# Patient Record
Sex: MALE | Race: WHITE | NOT HISPANIC OR LATINO | Employment: OTHER | URBAN - METROPOLITAN AREA
[De-identification: names, ages, dates, MRNs, and addresses within clinical notes are randomized per-mention and may not be internally consistent; named-entity substitution may affect disease eponyms.]

---

## 2017-08-24 ENCOUNTER — HOSPITAL ENCOUNTER (EMERGENCY)
Facility: HOSPITAL | Age: 37
Discharge: HOME | End: 2017-08-24
Attending: SURGERY

## 2017-08-24 ENCOUNTER — NURSE TRIAGE (OUTPATIENT)
Dept: ADMINISTRATIVE | Facility: CLINIC | Age: 37
End: 2017-08-24

## 2017-08-24 VITALS
HEART RATE: 98 BPM | SYSTOLIC BLOOD PRESSURE: 144 MMHG | DIASTOLIC BLOOD PRESSURE: 96 MMHG | OXYGEN SATURATION: 98 % | RESPIRATION RATE: 20 BRPM | TEMPERATURE: 98 F | WEIGHT: 170 LBS

## 2017-08-24 DIAGNOSIS — R51.9 NONINTRACTABLE HEADACHE, UNSPECIFIED CHRONICITY PATTERN, UNSPECIFIED HEADACHE TYPE: Primary | ICD-10-CM

## 2017-08-24 PROCEDURE — 99284 EMERGENCY DEPT VISIT MOD MDM: CPT

## 2017-08-24 PROCEDURE — 25000003 PHARM REV CODE 250: Performed by: SURGERY

## 2017-08-24 RX ORDER — ONDANSETRON 4 MG/1
4 TABLET, ORALLY DISINTEGRATING ORAL EVERY 8 HOURS PRN
Qty: 20 TABLET | Refills: 0 | OUTPATIENT
Start: 2017-08-24 | End: 2021-01-20

## 2017-08-24 RX ORDER — KETOROLAC TROMETHAMINE 10 MG/1
10 TABLET, FILM COATED ORAL EVERY 6 HOURS PRN
Qty: 15 TABLET | Refills: 0 | OUTPATIENT
Start: 2017-08-24 | End: 2021-01-20

## 2017-08-24 RX ORDER — HYDROCODONE BITARTRATE AND ACETAMINOPHEN 5; 325 MG/1; MG/1
1 TABLET ORAL
Status: COMPLETED | OUTPATIENT
Start: 2017-08-24 | End: 2017-08-24

## 2017-08-24 RX ADMIN — HYDROCODONE BITARTRATE AND ACETAMINOPHEN 1 TABLET: 5; 325 TABLET ORAL at 06:08

## 2017-08-24 NOTE — ED NOTES
Received verbal report from GERALDINE Velasco.  Pt in ED room ED 01/ED 01B lying in stretcher, HOB 30 degrees. Stretcher is in low, locked position, side rails up x2. The patient is awake, alert and cooperative with a calm affect, patient is aware of environment. Airway is open and patent, respirations are spontaneous, normal respiratory effort and rate noted, skin warm and dry, full ROM in all extremities, appearance: NAD noted, resting comfortably.Call bell within reach of pt, pt instructed on use, pt verbalizes understanding of call bell use. Hourly rounding explained and white board updated.  Plan of care: family to bedside, observe and reassure, position of comfort, respirations even and unlabored, patient offers no complaints at this time, awaiting additional orders, will continue to monitor

## 2017-08-25 ENCOUNTER — HOSPITAL ENCOUNTER (EMERGENCY)
Facility: HOSPITAL | Age: 37
Discharge: HOME | End: 2017-08-25
Attending: SURGERY

## 2017-08-25 VITALS
WEIGHT: 170 LBS | HEART RATE: 86 BPM | RESPIRATION RATE: 16 BRPM | BODY MASS INDEX: 25.18 KG/M2 | TEMPERATURE: 97 F | SYSTOLIC BLOOD PRESSURE: 166 MMHG | DIASTOLIC BLOOD PRESSURE: 99 MMHG | HEIGHT: 69 IN

## 2017-08-25 DIAGNOSIS — R51.9 HEADACHE, UNSPECIFIED HEADACHE TYPE: Primary | ICD-10-CM

## 2017-08-25 DIAGNOSIS — R51.9 HA (HEADACHE): ICD-10-CM

## 2017-08-25 LAB
ALBUMIN SERPL BCP-MCNC: 4.1 G/DL
ALP SERPL-CCNC: 75 U/L
ALT SERPL W/O P-5'-P-CCNC: 88 U/L
ANION GAP SERPL CALC-SCNC: 10 MMOL/L
AST SERPL-CCNC: 43 U/L
BASOPHILS # BLD AUTO: 0.04 K/UL
BASOPHILS NFR BLD: 0.4 %
BILIRUB SERPL-MCNC: 0.4 MG/DL
BUN SERPL-MCNC: 14 MG/DL
CALCIUM SERPL-MCNC: 9.9 MG/DL
CHLORIDE SERPL-SCNC: 106 MMOL/L
CK MB SERPL-MCNC: 1 NG/ML
CK MB SERPL-RTO: 1 %
CK SERPL-CCNC: 96 U/L
CK SERPL-CCNC: 96 U/L
CO2 SERPL-SCNC: 28 MMOL/L
CREAT SERPL-MCNC: 1.1 MG/DL
CRP SERPL-MCNC: 1.2 MG/L
DIFFERENTIAL METHOD: NORMAL
EOSINOPHIL # BLD AUTO: 0.3 K/UL
EOSINOPHIL NFR BLD: 3.2 %
ERYTHROCYTE [DISTWIDTH] IN BLOOD BY AUTOMATED COUNT: 13.2 %
ERYTHROCYTE [SEDIMENTATION RATE] IN BLOOD BY WESTERGREN METHOD: 4 MM/HR
EST. GFR  (AFRICAN AMERICAN): >60 ML/MIN/1.73 M^2
EST. GFR  (NON AFRICAN AMERICAN): >60 ML/MIN/1.73 M^2
GLUCOSE SERPL-MCNC: 111 MG/DL
HCT VFR BLD AUTO: 45.8 %
HGB BLD-MCNC: 14.9 G/DL
LYMPHOCYTES # BLD AUTO: 4.3 K/UL
LYMPHOCYTES NFR BLD: 41.8 %
MCH RBC QN AUTO: 30.8 PG
MCHC RBC AUTO-ENTMCNC: 32.5 G/DL
MCV RBC AUTO: 95 FL
MONOCYTES # BLD AUTO: 1 K/UL
MONOCYTES NFR BLD: 9.6 %
NEUTROPHILS # BLD AUTO: 4.6 K/UL
NEUTROPHILS NFR BLD: 45 %
PLATELET # BLD AUTO: 198 K/UL
PMV BLD AUTO: 11.6 FL
POTASSIUM SERPL-SCNC: 4.1 MMOL/L
PROT SERPL-MCNC: 7.3 G/DL
RBC # BLD AUTO: 4.84 M/UL
SODIUM SERPL-SCNC: 144 MMOL/L
TROPONIN I SERPL DL<=0.01 NG/ML-MCNC: <0.006 NG/ML
WBC # BLD AUTO: 10.27 K/UL

## 2017-08-25 PROCEDURE — 36415 COLL VENOUS BLD VENIPUNCTURE: CPT

## 2017-08-25 PROCEDURE — 84484 ASSAY OF TROPONIN QUANT: CPT

## 2017-08-25 PROCEDURE — 93005 ELECTROCARDIOGRAM TRACING: CPT

## 2017-08-25 PROCEDURE — 85651 RBC SED RATE NONAUTOMATED: CPT

## 2017-08-25 PROCEDURE — 85025 COMPLETE CBC W/AUTO DIFF WBC: CPT

## 2017-08-25 PROCEDURE — 99284 EMERGENCY DEPT VISIT MOD MDM: CPT

## 2017-08-25 PROCEDURE — 63600175 PHARM REV CODE 636 W HCPCS: Performed by: SURGERY

## 2017-08-25 PROCEDURE — 82553 CREATINE MB FRACTION: CPT

## 2017-08-25 PROCEDURE — 93010 ELECTROCARDIOGRAM REPORT: CPT | Mod: ,,, | Performed by: INTERNAL MEDICINE

## 2017-08-25 PROCEDURE — 80053 COMPREHEN METABOLIC PANEL: CPT

## 2017-08-25 PROCEDURE — 96372 THER/PROPH/DIAG INJ SC/IM: CPT

## 2017-08-25 PROCEDURE — 86140 C-REACTIVE PROTEIN: CPT

## 2017-08-25 RX ORDER — HYDROCODONE BITARTRATE AND ACETAMINOPHEN 5; 325 MG/1; MG/1
1 TABLET ORAL EVERY 4 HOURS PRN
Qty: 10 TABLET | Refills: 0 | Status: SHIPPED | OUTPATIENT
Start: 2017-08-25 | End: 2017-09-04

## 2017-08-25 RX ORDER — PROMETHAZINE HYDROCHLORIDE 25 MG/ML
12.5 INJECTION, SOLUTION INTRAMUSCULAR; INTRAVENOUS
Status: COMPLETED | OUTPATIENT
Start: 2017-08-25 | End: 2017-08-25

## 2017-08-25 RX ORDER — HYDROMORPHONE HYDROCHLORIDE 1 MG/ML
1 INJECTION, SOLUTION INTRAMUSCULAR; INTRAVENOUS; SUBCUTANEOUS
Status: COMPLETED | OUTPATIENT
Start: 2017-08-25 | End: 2017-08-25

## 2017-08-25 RX ADMIN — HYDROMORPHONE HYDROCHLORIDE 1 MG: 1 INJECTION, SOLUTION INTRAMUSCULAR; INTRAVENOUS; SUBCUTANEOUS at 12:08

## 2017-08-25 RX ADMIN — PROMETHAZINE HYDROCHLORIDE 12.5 MG: 25 INJECTION INTRAMUSCULAR; INTRAVENOUS at 12:08

## 2017-08-25 NOTE — ED TRIAGE NOTES
"37 y.o. male presents to ER ED 01/ED 01A   Chief Complaint   Patient presents with    Headache     not resolved with meds given today   . Pt states that he took the medications and the right-sided "sharp shooting" pain is not getting any better.  "

## 2017-08-25 NOTE — TELEPHONE ENCOUNTER
Reason for Disposition   [1] SEVERE headache (e.g., excruciating) AND [2] not improved after 2 hours of pain medicine    Answer Assessment - Initial Assessment Questions  Girlfriend calling for pt. Reported he was seen in ER today for pain in back of head and behind ears. Had CT scan which was negative. Was dx with tension headache. Given medication when in ER and scrip for toradol which he took about 2030 - has not given any pain relief. Wants to know what else they can do.    Protocols used: ST HEADACHE-A-AH

## 2017-08-25 NOTE — ED PROVIDER NOTES
[facility]                                        August 24, 2017                   Chief Complaint  37 y.o. male with Headache (right temple area)    History of Present Illness  Dawson Jansen presents to the emergency room with right temple headache today  Patient is a sharp pain at the base of the right skull, radiating to the right temple  Patient on exam is alert and oriented ×3 with a GCS 15 and normal motor exam  Patient has no photophobia, no nausea vomiting, no hypertensive crisis tonight  Patient has good extraocular movement normal vision, normal accommodation  Has had headaches for just never this intense, denies thunderclap headache    The history is provided by the patient  History reviewed. No pertinent past medical history.  History reviewed. No pertinent surgical history.   No Known Allergies   History reviewed. No pertinent family history.    Review of Systems and Physical Exam     Review of Systems  -- Constitution - no fever, denies fatigue, no weakness, no chills  -- Eyes - no tearing or redness, no visual disturbance  -- Ear, Nose - no tinnitus or earache, no nasal congestion or discharge  -- Mouth,Throat - no sore throat, no toothache, normal voice, normal swallowing  -- Respiratory - denies cough and congestion, no shortness of breath, no LAND  -- Cardiovascular - denies chest pain, no palpitations, denies claudication  -- Gastrointestinal - denies abdominal pain, nausea, vomiting, or diarrhea  -- Musculoskeletal - denies back pain, negative for myalgias and arthralgias   -- Neurological - headache, denies weakness or seizure; no LOC  -- Skin - denies pallor, rash, or changes in skin. no hives or welts noted    Vital Signs  -- His oral temperature is 97.5 °F (36.4 °C).   -- His blood pressure is 144/96 and his pulse is 98.   -- His respiration is 20 and oxygen saturation is 98%.      Physical Exam  -- Nursing note and vitals reviewed  -- Head: Atraumatic. Normocephalic. No  obvious abnormality  -- Eyes: Pupils are equal and reactive to light. Normal conjunctiva and lids  -- Nose: Nose normal in appearance, nares grossly normal. No discharge  -- Throat: Mucous membranes moist, pharynx normal, normal tonsils. No lesions   -- Ears: External ears and TM normal bilaterally. Normal hearing and no drainage  -- Neck: Normal range of motion. Neck supple. No masses, trachea midline  -- Cardiac: Normal rate, regular rhythm and normal heart sounds  -- Pulmonary: Normal respiratory effort, breath sounds clear to auscultation  -- Abdominal: Soft, no tenderness. Normal bowel sounds. Normal liver edge  -- Musculoskeletal: Normal range of motion, no effusions. Joints stable   -- Neurological: No focal deficits. Showed good interaction with staff  -- Vascular: Posterior tibial, dorsalis pedis and radial pulses 2+ bilaterally  e    Emergency Room Course     Treatment and Evaluation  -- The CT of the head performed in the ER today was negative for acute pathology   -- PO 5 mg Norco given in today in the ER    Diagnosis  -- Nonintractable headache    Disposition and Plan  -- Disposition: home  -- Condition: stable  -- Follow-up: Patient to follow up with No primary care provider on file. in 1-2 days.  -- I advised the patient that we have found no life threatening condition today  -- At this time, I believe the patient is clinically stable for discharge.   -- The patient acknowledges that close follow up with a MD is required   -- Patient agrees to comply with all instruction and direction given in the ER    This note is dictated on Dragon Natural Speaking word recognition program.  There are word recognition mistakes that are occasionally missed on review.             Michael Hernandez MD  08/24/17 [id number]

## 2017-08-25 NOTE — ED PROVIDER NOTES
[facility]                                        August 25, 2017                   Chief Complaint  37 y.o. male with Headache (not resolved with meds given today)    History of Present Illness  Dawson Jansen presents to the emergency room with continued right-sided headache  Was seen earlier tonight the emergency room with a negative head CT for headache  Pt declined a shot at that time, states the by mouth and given relieved his headache  States the headache returned when he got home, same position for the past 2 days  Describes a sharp headache, from the right posterior scalp radiating to the temple  Patient has negative meningeal signs, has no signs of rash or shingles on exam  Pt states that the pain is reproducible when touching the right temple scalp area  Patient is again alert and oriented ×3 with a GCS 15 and normal neuro exam now  Works very hard with some stress, previous consideration of tension headache    The history is provided by the patient  History reviewed. No pertinent past medical history.  History reviewed. No pertinent surgical history.   No Known Allergies   No family history on file.    Review of Systems and Physical Exam     Review of Systems  -- Constitution - no fever, denies fatigue, no weakness, no chills  -- Eyes - no tearing or redness, no visual disturbance  -- Ear, Nose - no tinnitus or earache, no nasal congestion or discharge  -- Mouth,Throat - no sore throat, no toothache, normal voice, normal swallowing  -- Respiratory - denies cough and congestion, no shortness of breath, no LAND  -- Cardiovascular - denies chest pain, no palpitations, denies claudication  -- Gastrointestinal - denies abdominal pain, nausea, vomiting, or diarrhea  -- Genitourinary - no dysuria, no denies flank pain, no hematuria or frequency   -- Musculoskeletal - denies back pain, negative for myalgias and arthralgias   -- Neurological - headache, denies weakness or seizure; no LOC  -- Skin  - denies pallor, rash, or changes in skin. no hives or welts noted    Vital Signs  -- His oral temperature is 96.6 °F (35.9 °C).   -- His blood pressure is 166/99 and his pulse is 86.   -- His respiration is 16.      Physical Exam  -- Nursing note and vitals reviewed  -- Head: Atraumatic. Normocephalic. No obvious abnormality  -- Eyes: Pupils are equal and reactive to light. Normal conjunctiva and lids  -- Nose: Nose normal in appearance, nares grossly normal. No discharge  -- Throat: Mucous membranes moist, pharynx normal, normal tonsils. No lesions   -- Ears: External ears and TM normal bilaterally. Normal hearing and no drainage  -- Neck: Normal range of motion. Neck supple. No masses, trachea midline  -- Cardiac: Normal rate, regular rhythm and normal heart sounds  -- Pulmonary: Normal respiratory effort, breath sounds clear to auscultation  -- Abdominal: Soft, no tenderness. Normal bowel sounds. Normal liver edge  -- Musculoskeletal: Normal range of motion, no effusions. Joints stable   -- Neurological: No focal deficits. Showed good interaction with staff  -- Vascular: Posterior tibial, dorsalis pedis and radial pulses 2+ bilaterally      Emergency Room Course     Treatment and Evaluation  -- The CT of the head performed in the ER today was negative for acute pathology   -- The EKG findings today were without concerning findings from baseline   -- ESR and CRP are within normal limits    Lab values  --    -- K 4.1   --    -- CO2 28   -- BUN 14   -- CREATININE 1.1   --  (H)   -- ALKPHOS 75   -- AST 43 (H)   -- ALT 88 (H)   -- BILITOT 0.4   -- ALBUMIN 4.1   -- PROT 7.3   -- WBC 10.27   -- HGB 14.9   -- HCT 45.8   --    -- CPK 96   -- CPK 96   -- CPKMB 1.0   -- TROPONINI <0.006     Medications Given  -- IM 1 mg Dilaudid given today in the ER  -- IM 12.5 mg Phenergan given today in the ER     Medical Decision Making  -- Diagnosis management comments: 37 y.o. male with right-sided headache  ×2-3 days  -- Patient states the pain starts in the right skull base, radiating to the temple area  -- Patient was seen in the ER earlier tonight with a negative head CT at that time  -- Patient states his headache went away and then came back when he got home  -- Patient returns the ER tonight stating he has continued headache after discharge  -- Patient has head CT repeated which was again negative, negative metabolic workup  -- Normal EKG and lab work, no white count noted in the ER  -- Normal ESR and CRP also noted, we'll treat the headache conservatively  -- This headache present for couple days, counseled on neurology follow-up    Diagnosis  -- Headache    Disposition and Plan  -- Disposition: home  -- Condition: stable  -- Follow-up: Patient to follow up with M.D./neurology in 1-2 days.  -- I advised the patient that we have found no life threatening condition today  -- At this time, I believe the patient is clinically stable for discharge.   -- The patient acknowledges that close follow up with a MD is required   -- Patient agrees to comply with all instruction and direction given in the ER    This note is dictated on Dragon Natural Speaking word recognition program.  There are word recognition mistakes that are occasionally missed on review.           Michael Hernandez MD  08/25/17 0863

## 2017-08-25 NOTE — ED NOTES
Pt refusing to leave. States that the Toradol doesn't work and wants to be re-evaluated by the MD. Dr. Hernandez notified. Family is at the bedside.

## 2021-01-20 ENCOUNTER — HOSPITAL ENCOUNTER (EMERGENCY)
Facility: HOSPITAL | Age: 41
Discharge: HOME | End: 2021-01-20
Attending: SURGERY

## 2021-01-20 VITALS
BODY MASS INDEX: 25.84 KG/M2 | SYSTOLIC BLOOD PRESSURE: 153 MMHG | RESPIRATION RATE: 18 BRPM | HEART RATE: 102 BPM | DIASTOLIC BLOOD PRESSURE: 100 MMHG | TEMPERATURE: 98 F | OXYGEN SATURATION: 97 % | WEIGHT: 175 LBS

## 2021-01-20 DIAGNOSIS — M25.512 ACUTE PAIN OF LEFT SHOULDER: Primary | ICD-10-CM

## 2021-01-20 PROCEDURE — 99284 EMERGENCY DEPT VISIT MOD MDM: CPT | Mod: 25

## 2021-01-20 PROCEDURE — 96372 THER/PROPH/DIAG INJ SC/IM: CPT

## 2021-01-20 PROCEDURE — 63600175 PHARM REV CODE 636 W HCPCS: Performed by: SURGERY

## 2021-01-20 RX ORDER — CYCLOBENZAPRINE HCL 10 MG
10 TABLET ORAL 3 TIMES DAILY PRN
Qty: 10 TABLET | Refills: 0 | Status: SHIPPED | OUTPATIENT
Start: 2021-01-20 | End: 2021-01-25

## 2021-01-20 RX ORDER — KETOROLAC TROMETHAMINE 10 MG/1
10 TABLET, FILM COATED ORAL EVERY 6 HOURS PRN
Qty: 15 TABLET | Refills: 0 | Status: SHIPPED | OUTPATIENT
Start: 2021-01-20

## 2021-01-20 RX ORDER — KETOROLAC TROMETHAMINE 30 MG/ML
60 INJECTION, SOLUTION INTRAMUSCULAR; INTRAVENOUS
Status: COMPLETED | OUTPATIENT
Start: 2021-01-20 | End: 2021-01-20

## 2021-01-20 RX ORDER — ORPHENADRINE CITRATE 30 MG/ML
60 INJECTION INTRAMUSCULAR; INTRAVENOUS
Status: COMPLETED | OUTPATIENT
Start: 2021-01-20 | End: 2021-01-20

## 2021-01-20 RX ADMIN — KETOROLAC TROMETHAMINE 60 MG: 30 INJECTION, SOLUTION INTRAMUSCULAR at 10:01

## 2021-01-20 RX ADMIN — ORPHENADRINE CITRATE 60 MG: 30 INJECTION INTRAMUSCULAR; INTRAVENOUS at 10:01

## 2021-05-10 ENCOUNTER — PATIENT MESSAGE (OUTPATIENT)
Dept: RESEARCH | Facility: HOSPITAL | Age: 41
End: 2021-05-10